# Patient Record
Sex: FEMALE | Race: WHITE | URBAN - METROPOLITAN AREA
[De-identification: names, ages, dates, MRNs, and addresses within clinical notes are randomized per-mention and may not be internally consistent; named-entity substitution may affect disease eponyms.]

---
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
## 2023-07-06 ENCOUNTER — OFFICE VISIT (OUTPATIENT)
Dept: PRIMARY CARE CLINIC | Age: 23
End: 2023-07-06
Payer: COMMERCIAL

## 2023-07-06 VITALS
HEIGHT: 66 IN | HEART RATE: 82 BPM | BODY MASS INDEX: 21.69 KG/M2 | SYSTOLIC BLOOD PRESSURE: 118 MMHG | OXYGEN SATURATION: 99 % | DIASTOLIC BLOOD PRESSURE: 62 MMHG | TEMPERATURE: 99.2 F | WEIGHT: 135 LBS

## 2023-07-06 DIAGNOSIS — Z00.00 PHYSICAL EXAM: Primary | ICD-10-CM

## 2023-07-06 PROCEDURE — 99395 PREV VISIT EST AGE 18-39: CPT | Performed by: FAMILY MEDICINE

## 2023-07-06 PROCEDURE — 90715 TDAP VACCINE 7 YRS/> IM: CPT | Performed by: FAMILY MEDICINE

## 2023-07-06 PROCEDURE — 90471 IMMUNIZATION ADMIN: CPT | Performed by: FAMILY MEDICINE

## 2023-07-06 RX ORDER — LEVONORGESTREL 19.5 MG/1
INTRAUTERINE DEVICE INTRAUTERINE
COMMUNITY
Start: 2021-10-06

## 2023-07-06 SDOH — ECONOMIC STABILITY: HOUSING INSECURITY
IN THE LAST 12 MONTHS, WAS THERE A TIME WHEN YOU DID NOT HAVE A STEADY PLACE TO SLEEP OR SLEPT IN A SHELTER (INCLUDING NOW)?: NO

## 2023-07-06 SDOH — ECONOMIC STABILITY: INCOME INSECURITY: HOW HARD IS IT FOR YOU TO PAY FOR THE VERY BASICS LIKE FOOD, HOUSING, MEDICAL CARE, AND HEATING?: NOT HARD AT ALL

## 2023-07-06 SDOH — ECONOMIC STABILITY: FOOD INSECURITY: WITHIN THE PAST 12 MONTHS, YOU WORRIED THAT YOUR FOOD WOULD RUN OUT BEFORE YOU GOT MONEY TO BUY MORE.: NEVER TRUE

## 2023-07-06 SDOH — ECONOMIC STABILITY: FOOD INSECURITY: WITHIN THE PAST 12 MONTHS, THE FOOD YOU BOUGHT JUST DIDN'T LAST AND YOU DIDN'T HAVE MONEY TO GET MORE.: NEVER TRUE

## 2023-07-06 ASSESSMENT — PATIENT HEALTH QUESTIONNAIRE - PHQ9
SUM OF ALL RESPONSES TO PHQ QUESTIONS 1-9: 0
SUM OF ALL RESPONSES TO PHQ QUESTIONS 1-9: 0
SUM OF ALL RESPONSES TO PHQ9 QUESTIONS 1 & 2: 0
1. LITTLE INTEREST OR PLEASURE IN DOING THINGS: NOT AT ALL
SUM OF ALL RESPONSES TO PHQ9 QUESTIONS 1 & 2: 0
SUM OF ALL RESPONSES TO PHQ QUESTIONS 1-9: 0
2. FEELING DOWN, DEPRESSED OR HOPELESS: 0
SUM OF ALL RESPONSES TO PHQ QUESTIONS 1-9: 0
2. FEELING DOWN, DEPRESSED OR HOPELESS: NOT AT ALL
1. LITTLE INTEREST OR PLEASURE IN DOING THINGS: 0

## 2023-07-06 NOTE — PROGRESS NOTES
02/24/2003, 05/26/2004    DTaP vaccine 09/18/2006    HPV, GARDASIL 9, (age 6y-40y), IM, 0.5mL 04/08/2016, 11/17/2016, 04/26/2017    Hep A, HAVRIX, VAQTA, (age 17m-24y), IM, 0.5mL 11/06/2012, 07/30/2013    Hep B, ENGERIX-B, RECOMBIVAX-HB, (age Birth - 22y), IM, 0.5mL 2002, 2002, 02/24/2003    Hib vaccine 2002, 2002, 02/24/2003, 01/09/2004    Influenza Virus Vaccine 11/02/2011, 12/27/2013, 02/25/2015    Influenza, FLUARIX, FLULAVAL, Shirleyann Idol (age 10 mo+) AND AFLURIA, (age 1 y+), PF, 0.5mL 10/29/2020    Influenza, FLUMIST, (age 3-52 y), Live, Intranasal, 0.2mL 12/23/2009, 11/06/2012    MMR, Karen Mervin, M-M-R II, (age 12m+), SC, 0.5mL 01/09/2004    MMR-Varicella, PROQUAD, (age 14m -12y), SC, 0.5mL 08/13/2007    Meningococcal, MCV4, Unspecifd Conjugate (A,C,Y and W-135) 12/27/2013, 08/05/2019    Pneumococcal, PCV-13, PREVNAR 13, (age 6w+), IM, 0.5mL 2002, 2002, 02/24/2003, 08/25/2003    Poliovirus, IPOL, (age 6w+), SC/IM, 0.5mL 2002, 2002, 05/26/2004, 09/18/2006    TDaP, ADACEL (age 6y-58y), BOOSTRIX (age 10y+), IM, 0.5mL 12/27/2013    Varicella, VARIVAX, (age 12m+), SC, 0.5mL 08/25/2003       Allergies   Allergen Reactions    Pcn [Penicillins]      Outpatient Medications Marked as Taking for the 7/6/23 encounter (Office Visit) with Harris Conte MD   Medication Sig Dispense Refill    Levonorgestrel [facility]) IUD 19.5 mg          No past medical history on file.   Past Surgical History:   Procedure Laterality Date    WISDOM TOOTH EXTRACTION       Family History   Problem Relation Age of Onset    Diabetes Maternal Grandmother     Kidney Disease Maternal Grandmother      Social History     Socioeconomic History    Marital status: Single     Spouse name: Not on file    Number of children: Not on file    Years of education: Not on file    Highest education level: Not on file   Occupational History    Not on file   Tobacco Use    Smoking status: Never    Smokeless tobacco:

## 2024-04-13 NOTE — PROGRESS NOTES
History and Physical      Isael Rogers  YOB: 2000    Date of Service:  11/20/2020    Chief Complaint:   Isael Rogers is a 27 y.o. female who presents for complete physical examination. HPI:     1st year at :  [employer]. Living at home. Going into radiology technology program.     Wt Readings from Last 3 Encounters:   11/20/20 141 lb 3.2 oz (64 kg) (76 %, Z= 0.70)*     * Growth percentiles are based on CDC (Girls, 2-20 Years) data. BP Readings from Last 3 Encounters:   11/20/20 110/72       There is no problem list on file for this patient. Preventive Care:  Health Maintenance   Topic Date Due    HIV screen  08/11/2017    Chlamydia screen  08/11/2018    DTaP/Tdap/Td vaccine (7 - Td) 12/27/2023    Hepatitis A vaccine  Completed    Hepatitis B vaccine  Completed    Hib vaccine  Completed    HPV vaccine  Completed    Polio vaccine  Completed    Measles,Mumps,Rubella (MMR) vaccine  Completed    Varicella vaccine  Completed    Meningococcal (ACWY) vaccine  Completed    Flu vaccine  Completed    Pneumococcal 0-64 years Vaccine  Completed        Sexual activity: not currently. Has had 3 male partners in life.   Always practices safe sex   Self-breast exams: no  Last eye exam: January 2020, normal  Exercise: goes horse back riding  Seatbelt use: yes  Lipid panel: No results found for: CHOL, TRIG, HDL, LDLCALC, LDLDIRECT     Immunization History   Administered Date(s) Administered    DTP 2002, 2002, 02/24/2003, 05/26/2004    DTaP vaccine 09/18/2006    HPV 9-valent Jonathan Chávez) 04/08/2016, 11/17/2016, 04/26/2017    Hepatitis A Ped/Adol (Havrix, Vaqta) 11/06/2012, 07/30/2013    Hepatitis B Ped/Adol (Engerix-B, Recombivax HB) 2002, 2002, 02/24/2003    Hib vaccine 2002, 2002, 02/24/2003, 01/09/2004    Influenza Virus Vaccine 11/02/2011, 12/27/2013, 02/25/2015    Influenza, Live, Intranasal, Quadv, (Flumist 2-49 yrs) 12/23/2009, 11/06/2012    13-Apr-2024 06:49 Influenza, Quadv, IM, PF (6 mo and older Fluzone, Flulaval, Fluarix, and 3 yrs and older Afluria) 10/29/2020    MMR 01/09/2004    MMRV (ProQuad) 08/13/2007    Meningococcal, MCV4, Unspecifd Conjugate (A,C,Y and W-135) 12/27/2013, 08/05/2019    Pneumococcal Conjugate 13-valent (Mardel Stan) 2002, 2002, 02/24/2003, 08/25/2003    Polio IPV (IPOL) 2002, 2002, 05/26/2004, 09/18/2006    Tdap (Boostrix, Adacel) 12/27/2013    Varicella (Varivax) 08/25/2003       Allergies   Allergen Reactions    Pcn [Penicillins]      Outpatient Medications Marked as Taking for the 11/20/20 encounter (Office Visit) with Akira Bryant MD   Medication Sig Dispense Refill    norgestimate-ethinyl estradiol ([address] [address]) 0.25-35 MG-MCG per tablet Take 1 tablet by mouth daily         History reviewed. No pertinent past medical history.   Past Surgical History:   Procedure Laterality Date    WISDOM TOOTH EXTRACTION       Family History   Problem Relation Age of Onset    Diabetes Maternal Grandmother     Kidney Disease Maternal Grandmother      Social History     Socioeconomic History    Marital status: Single     Spouse name: Not on file    Number of children: Not on file    Years of education: Not on file    Highest education level: Not on file   Occupational History    Not on file   Social Needs    Financial resource strain: Not on file    Food insecurity     Worry: Not on file     Inability: Not on file    Transportation needs     Medical: Not on file     Non-medical: Not on file   Tobacco Use    Smoking status: Never Smoker    Smokeless tobacco: Never Used   Substance and Sexual Activity    Alcohol use: Yes     Frequency: 2-4 times a month     Drinks per session: 3 or 4     Binge frequency: Never     Comment: 3-4 drinks, a couple times a month    Drug use: Never    Sexual activity: Yes     Birth control/protection: Condom, Pill   Lifestyle    Physical activity     Days per week: Not on file     Minutes per session: Not on file    Stress: Not on file   Relationships    Social connections     Talks on phone: Not on file     Gets together: Not on file     Attends Tenriism service: Not on file     Active member of club or organization: Not on file     Attends meetings of clubs or organizations: Not on file     Relationship status: Not on file    Intimate partner violence     Fear of current or ex partner: Not on file     Emotionally abused: Not on file     Physically abused: Not on file     Forced sexual activity: Not on file   Other Topics Concern    Not on file   Social History Narrative    Not on file       Review of Systems:  Constitutional:  Negative for activity or appetite change, fever or fatigue  HENT:  Negative for congestion, sinus pressure, or rhinorrhea  Eyes:  Negative for eye pain or visual changes  Resp:  Negative for SOB, chest tightness, cough  Cardiovascular: Negative for CP, palpitations, OCONNOR, orthopnea, PND, LE edema  Gastrointestinal: Negative for abd pain, melena, BRBPR, N/V/D  Endocrine:  Negative for polydipsia and polyuria  :  Negative for dysuria, flank pain or urinary frequency  Musculoskeletal:  Negative for back pain or myalgias  Neuro:  Negative for dizziness or lightheadedness  Psych: negative for depression or anxiety      Physical Exam:   Vitals:    11/20/20 0918   BP: 110/72   Pulse: 82   Temp: 97.2 °F (36.2 °C)   TempSrc: Infrared   Weight: 141 lb 3.2 oz (64 kg)   Height: 5' 6\" (1.676 m)     Body mass index is 22.79 kg/m². Constitutional: She is oriented to person, place, and time. She appears well-developed and well-nourished. No distress. HEENT:   Head: Normocephalic and atraumatic. Right Ear: Tympanic membrane, external ear and ear canal normal.   Left Ear: Tympanic membrane, external ear and ear canal normal.   Nose: Nose normal.   Mouth/Throat: Oropharynx is clear and moist, and mucous membranes are normal.  There is no cervical adenopathy.   Eyes: Conjunctivae and extraocular motions are normal. Pupils are equal, round, and reactive to light. Neck: Neck supple. No JVD present. Carotid bruit is not present. No mass and no thyromegaly present. Cardiovascular: Normal rate, regular rhythm, normal heart sounds and intact distal pulses. Exam reveals no gallop and no friction rub. No murmur heard. Pulmonary/Chest: Effort normal and breath sounds normal. No respiratory distress. She has no wheezes, rhonchi or rales. Abdominal: Soft, non-tender. Bowel sounds and aorta are normal. She exhibits no organomegaly, mass or bruit. Musculoskeletal: Normal range of motion, no synovitis. She exhibits no edema. Neurological: She is alert and oriented to person, place, and time. She has normal reflexes. No cranial nerve deficit. Coordination normal.   Skin: Skin is warm and dry. There is no rash or erythema. No suspicious lesions noted. Psychiatric: She has a normal mood and affect. Her speech is normal and behavior is normal. Judgment, cognition and memory are normal.     Assessment/Plan:    Silvino King was seen today for established new doctor. Diagnoses and all orders for this visit:    Well adult exam  Normal exam.  Patient encouraged to continue healthy lifestyle. Up-to-date with vaccines.     Screening for hyperlipidemia  -     Lipid Panel    Screening for diabetes mellitus  -     Glucose    Screening, anemia, deficiency, iron  -     CBC Auto Differential    Screen for STD (sexually transmitted disease)  -     C.trachomatis N.gonorrhoeae DNA, Urine

## 2024-06-13 ENCOUNTER — TELEPHONE (OUTPATIENT)
Dept: PRIMARY CARE CLINIC | Age: 24
End: 2024-06-13

## 2024-06-13 ENCOUNTER — OFFICE VISIT (OUTPATIENT)
Dept: PRIMARY CARE CLINIC | Age: 24
End: 2024-06-13
Payer: COMMERCIAL

## 2024-06-13 VITALS
OXYGEN SATURATION: 98 % | DIASTOLIC BLOOD PRESSURE: 70 MMHG | HEART RATE: 89 BPM | TEMPERATURE: 98.8 F | WEIGHT: 148 LBS | SYSTOLIC BLOOD PRESSURE: 104 MMHG | BODY MASS INDEX: 23.89 KG/M2

## 2024-06-13 DIAGNOSIS — O03.9 MISCARRIAGE: ICD-10-CM

## 2024-06-13 DIAGNOSIS — F43.23 ADJUSTMENT DISORDER WITH MIXED ANXIETY AND DEPRESSED MOOD: Primary | ICD-10-CM

## 2024-06-13 PROCEDURE — 99214 OFFICE O/P EST MOD 30 MIN: CPT | Performed by: FAMILY MEDICINE

## 2024-06-13 RX ORDER — HYDROXYZINE 50 MG/1
25-50 TABLET, FILM COATED ORAL EVERY 8 HOURS PRN
Qty: 30 TABLET | Refills: 1 | Status: SHIPPED | OUTPATIENT
Start: 2024-06-13 | End: 2024-07-13

## 2024-06-13 ASSESSMENT — PATIENT HEALTH QUESTIONNAIRE - PHQ9
SUM OF ALL RESPONSES TO PHQ QUESTIONS 1-9: 0
1. LITTLE INTEREST OR PLEASURE IN DOING THINGS: NOT AT ALL
1. LITTLE INTEREST OR PLEASURE IN DOING THINGS: NOT AT ALL
SUM OF ALL RESPONSES TO PHQ QUESTIONS 1-9: 2
2. FEELING DOWN, DEPRESSED OR HOPELESS: NOT AT ALL
SUM OF ALL RESPONSES TO PHQ QUESTIONS 1-9: 2
SUM OF ALL RESPONSES TO PHQ QUESTIONS 1-9: 2
2. FEELING DOWN, DEPRESSED OR HOPELESS: SEVERAL DAYS
SUM OF ALL RESPONSES TO PHQ QUESTIONS 1-9: 0
SUM OF ALL RESPONSES TO PHQ QUESTIONS 1-9: 0
SUM OF ALL RESPONSES TO PHQ9 QUESTIONS 1 & 2: 0
2. FEELING DOWN, DEPRESSED OR HOPELESS: NOT AT ALL
SUM OF ALL RESPONSES TO PHQ9 QUESTIONS 1 & 2: 0
SUM OF ALL RESPONSES TO PHQ QUESTIONS 1-9: 0
SUM OF ALL RESPONSES TO PHQ QUESTIONS 1-9: 2
SUM OF ALL RESPONSES TO PHQ9 QUESTIONS 1 & 2: 2
1. LITTLE INTEREST OR PLEASURE IN DOING THINGS: SEVERAL DAYS

## 2024-06-13 ASSESSMENT — ANXIETY QUESTIONNAIRES
IF YOU CHECKED OFF ANY PROBLEMS ON THIS QUESTIONNAIRE, HOW DIFFICULT HAVE THESE PROBLEMS MADE IT FOR YOU TO DO YOUR WORK, TAKE CARE OF THINGS AT HOME, OR GET ALONG WITH OTHER PEOPLE: NOT DIFFICULT AT ALL
1. FEELING NERVOUS, ANXIOUS, OR ON EDGE: SEVERAL DAYS
2. NOT BEING ABLE TO STOP OR CONTROL WORRYING: NOT AT ALL
7. FEELING AFRAID AS IF SOMETHING AWFUL MIGHT HAPPEN: NOT AT ALL
3. WORRYING TOO MUCH ABOUT DIFFERENT THINGS: NOT AT ALL
6. BECOMING EASILY ANNOYED OR IRRITABLE: NOT AT ALL
5. BEING SO RESTLESS THAT IT IS HARD TO SIT STILL: NOT AT ALL
GAD7 TOTAL SCORE: 1
4. TROUBLE RELAXING: NOT AT ALL

## 2024-06-13 NOTE — PATIENT INSTRUCTIONS
Return in 1-2 months for recheck    [facility]  For Counselor: [phone], [email or web address]  For Psychiatrist: [phone], [email or web address]    [facility] ([facility]) (multiple locations)   [phone]     [facility]   [phone]     [facility]   [phone]     [facility] ([facility])  [phone]     [facility]   [address] [place] [address], [address]   [phone]      Counseling Services    [facility]  [address] [facility]  [place], [place] [address]  [phone]    [facility]   [address] [place], [place] [address]  [phone]      Psychiatrists    Dr. Richard Brown Dr. Neil Dubin  [address] [facility]    [address] [place], [place] [address]   [place], [place] [address]  [phone]    Dr. Abhishek Klein  [address] [facility]   [address] [place], [place] [address]   [place], [place] [address]  [phone]      Dr.Jerome ALBINO Christian  [address] [facility]   [address] [facility]  [place], [place] [address]   [place], [place] [address]  [phone]    Dr. Deon Stone  [address] [facility]   [address] [facility].  [place], [place] [address]   [place], [place] [address]  [phone]      Dr. Jaswinder Daly  [address] [facility]   [address] [place], [place] [address]   [place], [place] [address]  [phone]    Dr. Fiorella Jean Baptiste  [address] [facility]  [place], [place] [address]  [phone]    Community Mental Health Agencies    [facility]   [facility]  [address]    [address] [place], [place] [address]   [place], [place] [id number] [phone]    [facility]  [address] [facility]

## 2024-06-13 NOTE — TELEPHONE ENCOUNTER
Patient is calling to change pharmacy's due to the one she had her RX sent does not take her Insurance. She wants to switch to [facility] [address] [place]  P - [phone]

## 2024-06-13 NOTE — PROGRESS NOTES
[address]  [place], [place] [address]    [place], [place] [address]  [phone]  [email or web address]     Alcoholics Anonymous    Narcotic Anonymous  [email or web address].[email or web address]    www.[email or web address]  [phone]     [phone]    Smart Recovery     [facility] for Sobriety  www.smartrecovery.org    [email or web address].[email or web address]  [phone]      Advocacy & Support    [employer] [place] on Mental Illness  www.rik.org  [phone]        Crisis Care    Mobile Crisis  [phone] 513-281-CARE (Hotline)  [phone]       Please note that some or all of this record was generated using voice recognition software. If there are any questions about the content of this document, please contact the author as some errors in transcription may have occurred.

## 2024-09-10 ENCOUNTER — OFFICE VISIT (OUTPATIENT)
Dept: PRIMARY CARE CLINIC | Age: 24
End: 2024-09-10
Payer: COMMERCIAL

## 2024-09-10 VITALS
WEIGHT: 146 LBS | OXYGEN SATURATION: 99 % | BODY MASS INDEX: 23.46 KG/M2 | SYSTOLIC BLOOD PRESSURE: 110 MMHG | HEART RATE: 90 BPM | DIASTOLIC BLOOD PRESSURE: 70 MMHG | HEIGHT: 66 IN

## 2024-09-10 DIAGNOSIS — O03.9 MISCARRIAGE: Primary | ICD-10-CM

## 2024-09-10 DIAGNOSIS — F43.23 ADJUSTMENT DISORDER WITH MIXED ANXIETY AND DEPRESSED MOOD: ICD-10-CM

## 2024-09-10 DIAGNOSIS — J30.2 SEASONAL ALLERGIES: ICD-10-CM

## 2024-09-10 DIAGNOSIS — R09.81 NASAL CONGESTION: ICD-10-CM

## 2024-09-10 PROCEDURE — 99213 OFFICE O/P EST LOW 20 MIN: CPT | Performed by: STUDENT IN AN ORGANIZED HEALTH CARE EDUCATION/TRAINING PROGRAM

## 2024-09-10 RX ORDER — AZELASTINE 1 MG/ML
2 SPRAY, METERED NASAL 2 TIMES DAILY
Qty: 120 ML | Refills: 1 | Status: SHIPPED | OUTPATIENT
Start: 2024-09-10

## 2024-09-10 RX ORDER — AZELASTINE 1 MG/ML
2 SPRAY, METERED NASAL 2 TIMES DAILY
Qty: 120 ML | Refills: 1 | Status: SHIPPED | OUTPATIENT
Start: 2024-09-10 | End: 2024-09-10 | Stop reason: CLARIF

## 2024-09-10 RX ORDER — NORGESTIMATE AND ETHINYL ESTRADIOL 0.25-0.035
1 KIT ORAL DAILY
Qty: 84 PACKET | Refills: 0 | Status: SHIPPED | OUTPATIENT
Start: 2024-09-10

## 2024-09-10 SDOH — ECONOMIC STABILITY: INCOME INSECURITY: HOW HARD IS IT FOR YOU TO PAY FOR THE VERY BASICS LIKE FOOD, HOUSING, MEDICAL CARE, AND HEATING?: NOT HARD AT ALL

## 2024-09-10 SDOH — ECONOMIC STABILITY: FOOD INSECURITY: WITHIN THE PAST 12 MONTHS, THE FOOD YOU BOUGHT JUST DIDN'T LAST AND YOU DIDN'T HAVE MONEY TO GET MORE.: NEVER TRUE

## 2024-09-10 SDOH — ECONOMIC STABILITY: FOOD INSECURITY: WITHIN THE PAST 12 MONTHS, YOU WORRIED THAT YOUR FOOD WOULD RUN OUT BEFORE YOU GOT MONEY TO BUY MORE.: NEVER TRUE
